# Patient Record
Sex: FEMALE | Race: WHITE | NOT HISPANIC OR LATINO | URBAN - METROPOLITAN AREA
[De-identification: names, ages, dates, MRNs, and addresses within clinical notes are randomized per-mention and may not be internally consistent; named-entity substitution may affect disease eponyms.]

---

## 2023-08-07 ENCOUNTER — INPATIENT (INPATIENT)
Facility: HOSPITAL | Age: 38
LOS: 2 days | Discharge: HOME | End: 2023-08-10
Attending: OBSTETRICS & GYNECOLOGY | Admitting: OBSTETRICS & GYNECOLOGY
Payer: COMMERCIAL

## 2023-08-07 VITALS — WEIGHT: 186.95 LBS | HEIGHT: 62 IN

## 2023-08-07 DIAGNOSIS — Z3A.00 WEEKS OF GESTATION OF PREGNANCY NOT SPECIFIED: ICD-10-CM

## 2023-08-07 DIAGNOSIS — O26.899 OTHER SPECIFIED PREGNANCY RELATED CONDITIONS, UNSPECIFIED TRIMESTER: ICD-10-CM

## 2023-08-07 LAB
BASOPHILS # BLD AUTO: 0.03 K/UL — SIGNIFICANT CHANGE UP (ref 0–0.2)
BASOPHILS NFR BLD AUTO: 0.3 % — SIGNIFICANT CHANGE UP (ref 0–2)
EOSINOPHIL # BLD AUTO: 0.08 K/UL — SIGNIFICANT CHANGE UP (ref 0–0.5)
EOSINOPHIL NFR BLD AUTO: 0.7 % — SIGNIFICANT CHANGE UP (ref 0–6)
HCT VFR BLD CALC: 32.9 % — LOW (ref 34.5–45)
HGB BLD-MCNC: 11.8 G/DL — SIGNIFICANT CHANGE UP (ref 11.5–15.5)
IMM GRANULOCYTES NFR BLD AUTO: 0.7 % — SIGNIFICANT CHANGE UP (ref 0–0.9)
LYMPHOCYTES # BLD AUTO: 2.39 K/UL — SIGNIFICANT CHANGE UP (ref 1–3.3)
LYMPHOCYTES # BLD AUTO: 20.2 % — SIGNIFICANT CHANGE UP (ref 13–44)
MCHC RBC-ENTMCNC: 30.3 PG — SIGNIFICANT CHANGE UP (ref 27–34)
MCHC RBC-ENTMCNC: 35.9 GM/DL — SIGNIFICANT CHANGE UP (ref 32–36)
MCV RBC AUTO: 84.6 FL — SIGNIFICANT CHANGE UP (ref 80–100)
MONOCYTES # BLD AUTO: 0.96 K/UL — HIGH (ref 0–0.9)
MONOCYTES NFR BLD AUTO: 8.1 % — SIGNIFICANT CHANGE UP (ref 2–14)
NEUTROPHILS # BLD AUTO: 8.29 K/UL — HIGH (ref 1.8–7.4)
NEUTROPHILS NFR BLD AUTO: 70 % — SIGNIFICANT CHANGE UP (ref 43–77)
PLATELET # BLD AUTO: 169 K/UL — SIGNIFICANT CHANGE UP (ref 150–400)
RBC # BLD: 3.89 M/UL — SIGNIFICANT CHANGE UP (ref 3.8–5.2)
RBC # FLD: 12.9 % — SIGNIFICANT CHANGE UP (ref 10.3–14.5)
WBC # BLD: 11.83 K/UL — HIGH (ref 3.8–10.5)
WBC # FLD AUTO: 11.83 K/UL — HIGH (ref 3.8–10.5)

## 2023-08-07 PROCEDURE — 85025 COMPLETE CBC W/AUTO DIFF WBC: CPT

## 2023-08-07 PROCEDURE — C1889: CPT

## 2023-08-07 PROCEDURE — 86780 TREPONEMA PALLIDUM: CPT

## 2023-08-07 PROCEDURE — 94760 N-INVAS EAR/PLS OXIMETRY 1: CPT

## 2023-08-07 PROCEDURE — 59050 FETAL MONITOR W/REPORT: CPT

## 2023-08-07 PROCEDURE — 86901 BLOOD TYPING SEROLOGIC RH(D): CPT

## 2023-08-07 PROCEDURE — 86900 BLOOD TYPING SEROLOGIC ABO: CPT

## 2023-08-07 PROCEDURE — 36415 COLL VENOUS BLD VENIPUNCTURE: CPT

## 2023-08-07 PROCEDURE — 86850 RBC ANTIBODY SCREEN: CPT

## 2023-08-07 PROCEDURE — 99214 OFFICE O/P EST MOD 30 MIN: CPT

## 2023-08-07 RX ORDER — OXYTOCIN 10 UNIT/ML
VIAL (ML) INJECTION
Qty: 30 | Refills: 0 | Status: DISCONTINUED | OUTPATIENT
Start: 2023-08-07 | End: 2023-08-08

## 2023-08-07 RX ORDER — OXYTOCIN 10 UNIT/ML
333.33 VIAL (ML) INJECTION
Qty: 20 | Refills: 0 | Status: DISCONTINUED | OUTPATIENT
Start: 2023-08-07 | End: 2023-08-10

## 2023-08-07 RX ORDER — CHLORHEXIDINE GLUCONATE 213 G/1000ML
1 SOLUTION TOPICAL DAILY
Refills: 0 | Status: DISCONTINUED | OUTPATIENT
Start: 2023-08-07 | End: 2023-08-08

## 2023-08-07 RX ORDER — SODIUM CHLORIDE 9 MG/ML
1000 INJECTION, SOLUTION INTRAVENOUS
Refills: 0 | Status: DISCONTINUED | OUTPATIENT
Start: 2023-08-07 | End: 2023-08-08

## 2023-08-07 RX ORDER — CITRIC ACID/SODIUM CITRATE 300-500 MG
30 SOLUTION, ORAL ORAL ONCE
Refills: 0 | Status: COMPLETED | OUTPATIENT
Start: 2023-08-07 | End: 2023-08-08

## 2023-08-07 RX ADMIN — Medication 2 MILLIUNIT(S)/MIN: at 23:01

## 2023-08-08 LAB
ABO RH CONFIRMATION: SIGNIFICANT CHANGE UP
T PALLIDUM AB TITR SER: NEGATIVE — SIGNIFICANT CHANGE UP

## 2023-08-08 RX ORDER — SIMETHICONE 80 MG/1
80 TABLET, CHEWABLE ORAL EVERY 4 HOURS
Refills: 0 | Status: DISCONTINUED | OUTPATIENT
Start: 2023-08-08 | End: 2023-08-10

## 2023-08-08 RX ORDER — SODIUM CHLORIDE 9 MG/ML
1000 INJECTION, SOLUTION INTRAVENOUS
Refills: 0 | Status: DISCONTINUED | OUTPATIENT
Start: 2023-08-08 | End: 2023-08-10

## 2023-08-08 RX ORDER — MAGNESIUM HYDROXIDE 400 MG/1
30 TABLET, CHEWABLE ORAL
Refills: 0 | Status: DISCONTINUED | OUTPATIENT
Start: 2023-08-08 | End: 2023-08-10

## 2023-08-08 RX ORDER — GENTAMICIN SULFATE 40 MG/ML
320 VIAL (ML) INJECTION ONCE
Refills: 0 | Status: COMPLETED | OUTPATIENT
Start: 2023-08-08 | End: 2023-08-08

## 2023-08-08 RX ORDER — ENOXAPARIN SODIUM 100 MG/ML
40 INJECTION SUBCUTANEOUS EVERY 24 HOURS
Refills: 0 | Status: DISCONTINUED | OUTPATIENT
Start: 2023-08-08 | End: 2023-08-10

## 2023-08-08 RX ORDER — ACETAMINOPHEN 500 MG
975 TABLET ORAL
Refills: 0 | Status: DISCONTINUED | OUTPATIENT
Start: 2023-08-08 | End: 2023-08-10

## 2023-08-08 RX ORDER — DIPHENHYDRAMINE HCL 50 MG
25 CAPSULE ORAL EVERY 6 HOURS
Refills: 0 | Status: DISCONTINUED | OUTPATIENT
Start: 2023-08-08 | End: 2023-08-10

## 2023-08-08 RX ORDER — AMPICILLIN TRIHYDRATE 250 MG
2000 CAPSULE ORAL ONCE
Refills: 0 | Status: COMPLETED | OUTPATIENT
Start: 2023-08-08 | End: 2023-08-08

## 2023-08-08 RX ORDER — IBUPROFEN 200 MG
600 TABLET ORAL EVERY 6 HOURS
Refills: 0 | Status: COMPLETED | OUTPATIENT
Start: 2023-08-08 | End: 2024-07-06

## 2023-08-08 RX ORDER — TETANUS TOXOID, REDUCED DIPHTHERIA TOXOID AND ACELLULAR PERTUSSIS VACCINE, ADSORBED 5; 2.5; 8; 8; 2.5 [IU]/.5ML; [IU]/.5ML; UG/.5ML; UG/.5ML; UG/.5ML
0.5 SUSPENSION INTRAMUSCULAR ONCE
Refills: 0 | Status: DISCONTINUED | OUTPATIENT
Start: 2023-08-08 | End: 2023-08-10

## 2023-08-08 RX ORDER — LANOLIN
1 OINTMENT (GRAM) TOPICAL EVERY 6 HOURS
Refills: 0 | Status: DISCONTINUED | OUTPATIENT
Start: 2023-08-08 | End: 2023-08-10

## 2023-08-08 RX ORDER — OXYCODONE HYDROCHLORIDE 5 MG/1
5 TABLET ORAL
Refills: 0 | Status: DISCONTINUED | OUTPATIENT
Start: 2023-08-08 | End: 2023-08-10

## 2023-08-08 RX ORDER — OXYTOCIN 10 UNIT/ML
333.33 VIAL (ML) INJECTION
Qty: 20 | Refills: 0 | Status: DISCONTINUED | OUTPATIENT
Start: 2023-08-08 | End: 2023-08-10

## 2023-08-08 RX ORDER — ACETAMINOPHEN 500 MG
1000 TABLET ORAL ONCE
Refills: 0 | Status: COMPLETED | OUTPATIENT
Start: 2023-08-08 | End: 2023-08-08

## 2023-08-08 RX ORDER — KETOROLAC TROMETHAMINE 30 MG/ML
30 SYRINGE (ML) INJECTION EVERY 6 HOURS
Refills: 0 | Status: COMPLETED | OUTPATIENT
Start: 2023-08-08 | End: 2023-08-09

## 2023-08-08 RX ORDER — OXYCODONE HYDROCHLORIDE 5 MG/1
5 TABLET ORAL ONCE
Refills: 0 | Status: DISCONTINUED | OUTPATIENT
Start: 2023-08-08 | End: 2023-08-10

## 2023-08-08 RX ADMIN — Medication 30 MILLIGRAM(S): at 16:39

## 2023-08-08 RX ADMIN — Medication 975 MILLIGRAM(S): at 22:10

## 2023-08-08 RX ADMIN — Medication 30 MILLILITER(S): at 03:25

## 2023-08-08 RX ADMIN — ENOXAPARIN SODIUM 40 MILLIGRAM(S): 100 INJECTION SUBCUTANEOUS at 18:05

## 2023-08-08 RX ADMIN — Medication 30 MILLIGRAM(S): at 12:13

## 2023-08-08 RX ADMIN — Medication 400 MILLIGRAM(S): at 02:54

## 2023-08-08 RX ADMIN — Medication 30 MILLIGRAM(S): at 23:58

## 2023-08-08 RX ADMIN — Medication 1000 MILLIGRAM(S): at 05:26

## 2023-08-08 RX ADMIN — Medication 216 MILLIGRAM(S): at 02:54

## 2023-08-08 RX ADMIN — Medication 250 MILLIGRAM(S): at 05:11

## 2023-08-08 RX ADMIN — Medication 975 MILLIGRAM(S): at 16:38

## 2023-08-08 RX ADMIN — Medication 30 MILLIGRAM(S): at 18:11

## 2023-08-08 RX ADMIN — Medication 975 MILLIGRAM(S): at 15:34

## 2023-08-08 RX ADMIN — Medication 30 MILLIGRAM(S): at 18:05

## 2023-08-08 RX ADMIN — Medication 975 MILLIGRAM(S): at 21:10

## 2023-08-09 LAB
BASOPHILS # BLD AUTO: 0.04 K/UL — SIGNIFICANT CHANGE UP (ref 0–0.2)
BASOPHILS NFR BLD AUTO: 0.3 % — SIGNIFICANT CHANGE UP (ref 0–2)
EOSINOPHIL # BLD AUTO: 0.11 K/UL — SIGNIFICANT CHANGE UP (ref 0–0.5)
EOSINOPHIL NFR BLD AUTO: 0.8 % — SIGNIFICANT CHANGE UP (ref 0–6)
HCT VFR BLD CALC: 27.6 % — LOW (ref 34.5–45)
HGB BLD-MCNC: 9.4 G/DL — LOW (ref 11.5–15.5)
IMM GRANULOCYTES NFR BLD AUTO: 0.7 % — SIGNIFICANT CHANGE UP (ref 0–0.9)
LYMPHOCYTES # BLD AUTO: 1.95 K/UL — SIGNIFICANT CHANGE UP (ref 1–3.3)
LYMPHOCYTES # BLD AUTO: 14 % — SIGNIFICANT CHANGE UP (ref 13–44)
MCHC RBC-ENTMCNC: 29.9 PG — SIGNIFICANT CHANGE UP (ref 27–34)
MCHC RBC-ENTMCNC: 34.1 GM/DL — SIGNIFICANT CHANGE UP (ref 32–36)
MCV RBC AUTO: 87.9 FL — SIGNIFICANT CHANGE UP (ref 80–100)
MONOCYTES # BLD AUTO: 0.92 K/UL — HIGH (ref 0–0.9)
MONOCYTES NFR BLD AUTO: 6.6 % — SIGNIFICANT CHANGE UP (ref 2–14)
NEUTROPHILS # BLD AUTO: 10.81 K/UL — HIGH (ref 1.8–7.4)
NEUTROPHILS NFR BLD AUTO: 77.6 % — HIGH (ref 43–77)
PLATELET # BLD AUTO: 139 K/UL — LOW (ref 150–400)
RBC # BLD: 3.14 M/UL — LOW (ref 3.8–5.2)
RBC # FLD: 13.2 % — SIGNIFICANT CHANGE UP (ref 10.3–14.5)
WBC # BLD: 13.93 K/UL — HIGH (ref 3.8–10.5)
WBC # FLD AUTO: 13.93 K/UL — HIGH (ref 3.8–10.5)

## 2023-08-09 RX ORDER — IBUPROFEN 200 MG
600 TABLET ORAL EVERY 6 HOURS
Refills: 0 | Status: DISCONTINUED | OUTPATIENT
Start: 2023-08-09 | End: 2023-08-10

## 2023-08-09 RX ADMIN — Medication 600 MILLIGRAM(S): at 12:05

## 2023-08-09 RX ADMIN — Medication 600 MILLIGRAM(S): at 06:04

## 2023-08-09 RX ADMIN — Medication 975 MILLIGRAM(S): at 15:03

## 2023-08-09 RX ADMIN — Medication 975 MILLIGRAM(S): at 08:42

## 2023-08-09 RX ADMIN — Medication 975 MILLIGRAM(S): at 03:56

## 2023-08-09 RX ADMIN — Medication 600 MILLIGRAM(S): at 06:54

## 2023-08-09 RX ADMIN — Medication 975 MILLIGRAM(S): at 09:15

## 2023-08-09 RX ADMIN — Medication 600 MILLIGRAM(S): at 18:05

## 2023-08-09 RX ADMIN — Medication 975 MILLIGRAM(S): at 15:35

## 2023-08-09 RX ADMIN — Medication 600 MILLIGRAM(S): at 17:34

## 2023-08-09 RX ADMIN — Medication 600 MILLIGRAM(S): at 11:33

## 2023-08-09 RX ADMIN — Medication 975 MILLIGRAM(S): at 22:00

## 2023-08-09 RX ADMIN — Medication 975 MILLIGRAM(S): at 21:11

## 2023-08-09 RX ADMIN — Medication 30 MILLIGRAM(S): at 00:58

## 2023-08-09 RX ADMIN — ENOXAPARIN SODIUM 40 MILLIGRAM(S): 100 INJECTION SUBCUTANEOUS at 17:34

## 2023-08-09 RX ADMIN — Medication 975 MILLIGRAM(S): at 02:56

## 2023-08-09 NOTE — DISCHARGE NOTE OB - CARE PLAN
Principal Discharge DX:	 delivery delivered  Assessment and plan of treatment:	Patient had  section. Hospital course was uncomplicated. Patient should plan to make an appointment with the OBGYN office within a week for an incision check and 6 weeks for postpartum follow-up. Postpartum precautions were given at the time of discharge.    At the time of discharge patient's pain was well controlled, she was voiding, tolerating PO and ambulating without difficulty. Labs and vitals wnl at the time of discharge.   1

## 2023-08-09 NOTE — DISCHARGE NOTE OB - MATERIALS PROVIDED
Vaccinations/  Immunization Record/Breastfeeding Log/Bottle Feeding Log/Breastfeeding Mother’s Support Group Information/Guide to Postpartum Care/[facility] Hearing Screen Program/Back To Sleep Handout/Shaken Baby Prevention Handout/Breastfeeding Guide and Packet/Birth Certificate Instructions

## 2023-08-09 NOTE — DISCHARGE NOTE OB - CARE PROVIDER_API CALL
Bertha Lizarraga  Obstetrics and Gynecology  [address] [facility], [address]th [place] [address]  Phone: [phone]  Fax: [phone]  Follow Up Time: 1 week

## 2023-08-09 NOTE — DISCHARGE NOTE OB - PATIENT PORTAL LINK FT
You can access the FollowMyHealth Patient Portal offered by [facility] by registering at the following website: [email or web address]. By joining [facility]’s FollowMyHealth portal, you will also be able to view your health information using other applications (apps) compatible with our system.

## 2023-08-09 NOTE — DISCHARGE NOTE OB - HOSPITAL COURSE
38 yo  female s/p pCS at 39w2d  Cat II tracing on 2023. Course complicated by chorio s/p A/G/O. Patient transferred to post partum unit, uncomplicated hospital course. At the time of discharge patient was tolerating regular diet PO, ambulating, voiding, and demonstrating bowel function. Pain well controlled with pain medications PRN.

## 2023-08-10 VITALS
OXYGEN SATURATION: 97 % | DIASTOLIC BLOOD PRESSURE: 79 MMHG | HEART RATE: 89 BPM | RESPIRATION RATE: 16 BRPM | TEMPERATURE: 98 F | SYSTOLIC BLOOD PRESSURE: 116 MMHG

## 2023-08-10 RX ADMIN — Medication 975 MILLIGRAM(S): at 04:00

## 2023-08-10 RX ADMIN — Medication 600 MILLIGRAM(S): at 06:09

## 2023-08-10 RX ADMIN — Medication 600 MILLIGRAM(S): at 07:00

## 2023-08-10 RX ADMIN — Medication 600 MILLIGRAM(S): at 00:10

## 2023-08-10 RX ADMIN — Medication 600 MILLIGRAM(S): at 01:00

## 2023-08-10 RX ADMIN — Medication 975 MILLIGRAM(S): at 08:01

## 2023-08-10 RX ADMIN — Medication 975 MILLIGRAM(S): at 03:11

## 2023-08-10 RX ADMIN — Medication 975 MILLIGRAM(S): at 08:30

## 2023-08-10 NOTE — PROGRESS NOTE ADULT - SUBJECTIVE AND OBJECTIVE BOX
Postpartum Note,  Section discharge  She is a  37y woman who is now post-operative day: 2    Subjective:  The patient feels well.  She is ambulating without difficulty.  She is tolerating PO.  She is voiding.  She denies nausea and vomiting.  Her pain is controlled with Tylenol and Motrin  She reports normal postpartum bleeding  She is breastfeeding.  Infant is being transported to [facility].  Patient requesting early discharge    Physical exam:    Vital Signs Last 24 Hrs  T(C): 36.8 (10 Aug 2023 00:25), Max: 36.8 (10 Aug 2023 00:25)  T(F): 98.2 (10 Aug 2023 00:), Max: 98.2 (10 Aug 2023 00:)  HR: 68 (10 Aug 2023 00:25) (68 - 82)  BP: 113/83 (10 Aug 2023 00:25) (105/73 - 122/72)  BP(mean): --  RR: 16 (10 Aug 2023 00:25) (16 - 18)  SpO2: 98% (10 Aug 2023 00:25) (98% - 98%)    Parameters below as of 10 Aug 2023 00:25  Patient On (Oxygen Delivery Method): room air        Gen: NAD  Breast: Soft, nontender, non engorged, nipples intact  Abdomen: Soft, nontender, no distension , firm uterine fundus at umbilicus.  Incision: Clean, dry, and intact with steri strips  Pelvic: Normal lochia noted  Ext: No calf tenderness    LABS:                        9.4    13.93 )-----------( 139      ( 09 Aug 2023 08:31 )             27.6     blood type  Rubella status:     Allergies    No Known Allergies    Intolerances        MEDICATIONS  (STANDING):  acetaminophen     Tablet .. 975 milliGRAM(s) Oral <User Schedule>  diphtheria/tetanus/pertussis (acellular) Vaccine (Adacel) 0.5 milliLiter(s) IntraMuscular once  enoxaparin Injectable 40 milliGRAM(s) SubCutaneous every 24 hours  ibuprofen  Tablet. 600 milliGRAM(s) Oral every 6 hours  lactated ringers. 1000 milliLiter(s) (125 mL/Hr) IV Continuous <Continuous>  oxytocin Infusion 333.333 milliUNIT(s)/Min (1000 mL/Hr) IV Continuous <Continuous>  oxytocin Infusion 333.333 milliUNIT(s)/Min (1000 mL/Hr) IV Continuous <Continuous>    MEDICATIONS  (PRN):  diphenhydrAMINE 25 milliGRAM(s) Oral every 6 hours PRN Pruritus  lanolin Ointment 1 Application(s) Topical every 6 hours PRN Sore Nipples  magnesium hydroxide Suspension 30 milliLiter(s) Oral two times a day PRN Constipation  oxyCODONE    IR 5 milliGRAM(s) Oral once PRN Moderate to Severe Pain (4-10)  oxyCODONE    IR 5 milliGRAM(s) Oral every 3 hours PRN Moderate to Severe Pain (4-10)  simethicone 80 milliGRAM(s) Chew every 4 hours PRN Gas        Assessment and Plan  POD # 2  s/p  section  Doing well.  Encourage ambulation.  Discharge home today.  F/U in1 weeks for incision check.  verbal discharge instructions d/w patient  - discharge summary given  Call for fevers, chills, nausea, vomiting, heavy vaginal bleeding, vaginal discharge, severe pain, symptoms of depression, problems with incision or any other concerning symptoms.  Nothing in vagina and no heavy lifting x 6 weeks.  No driving x 1 week        Evita Mejias CNM           
  37y  Female POD#1  s/p PCS   S: Patient is doing well and has no acute complaints. Pain is well controlled with medications. Patient is tolerating regular diet. She is OOB and urinating w/o any difficulties. Passing flatus. Breastfeeding.    O: Vital Signs Last 24 Hrs  T(C): 36.6 (09 Aug 2023 12:52), Max: 36.8 (09 Aug 2023 04:50)  T(F): 97.9 (09 Aug 2023 12:52), Max: 98.2 (09 Aug 2023 04:50)  HR: 81 (09 Aug 2023 12:52) (72 - 90)  BP: 105/73 (09 Aug 2023 12:52) (98/55 - 111/60)  RR: 16 (09 Aug 2023 12:52) (16 - 18)  SpO2: 98% (09 Aug 2023 12:52) (97% - 100%)    Parameters below as of 09 Aug 2023 12:52  Patient On (Oxygen Delivery Method): room air      Appears well, resting in bed  Abdo: Fundus firm w/o tenderness   Dressing: c/d/i  PV: No calf tenderness, edema.     Labs:                         9.4    13.93 )-----------( 139      ( 09 Aug 2023 08:31 )             27.6

## 2023-08-14 DIAGNOSIS — Z3A.39 39 WEEKS GESTATION OF PREGNANCY: ICD-10-CM

## 2023-08-14 DIAGNOSIS — O41.1230 CHORIOAMNIONITIS, THIRD TRIMESTER, NOT APPLICABLE OR UNSPECIFIED: ICD-10-CM

## 2023-08-14 DIAGNOSIS — Z34.03 ENCOUNTER FOR SUPERVISION OF NORMAL FIRST PREGNANCY, THIRD TRIMESTER: ICD-10-CM

## 2024-09-24 NOTE — DISCHARGE NOTE OB - TEMPERATURE GREATER THAN 100.0  F ORALLY
Unable to reach at number listed for callback. VM left.     Reason for Disposition   Message left on unidentified voice mail.  Phone number verified.    Protocols used: No Contact or Duplicate Contact Call-A-SOSA     Statement Selected

## 2025-05-06 NOTE — PATIENT PROFILE OB - FUNCTIONAL ASSESSMENT - DAILY ACTIVITY ASSESSMENT TYPE
Detail Level: Detailed
Quality 137: Melanoma: Continuity Of Care - Recall System: Patient information entered into a recall system that includes: target date for the next exam specified AND a process to follow up with patients regarding missed or unscheduled appointments
Admission